# Patient Record
Sex: MALE | Race: WHITE | Employment: FULL TIME | URBAN - METROPOLITAN AREA
[De-identification: names, ages, dates, MRNs, and addresses within clinical notes are randomized per-mention and may not be internally consistent; named-entity substitution may affect disease eponyms.]

---

## 2022-10-29 ENCOUNTER — HOSPITAL ENCOUNTER (EMERGENCY)
Age: 52
Discharge: HOME | End: 2022-10-29
Attending: EMERGENCY MEDICINE | Admitting: EMERGENCY MEDICINE
Payer: COMMERCIAL

## 2022-10-29 VITALS
BODY MASS INDEX: 42.59 KG/M2 | WEIGHT: 265 LBS | TEMPERATURE: 100.2 F | DIASTOLIC BLOOD PRESSURE: 76 MMHG | OXYGEN SATURATION: 97 % | HEART RATE: 90 BPM | SYSTOLIC BLOOD PRESSURE: 139 MMHG | RESPIRATION RATE: 18 BRPM | HEIGHT: 66 IN

## 2022-10-29 DIAGNOSIS — J10.1 INFLUENZA A: Primary | ICD-10-CM

## 2022-10-29 LAB
FLUAV AG NPH QL IA: POSITIVE
FLUBV AG NPH QL IA: NEGATIVE
SPECIMEN SOURCE: ABNORMAL

## 2022-10-29 PROCEDURE — 87804 INFLUENZA ASSAY W/OPTIC: CPT

## 2022-10-29 PROCEDURE — 99283 EMERGENCY DEPT VISIT LOW MDM: CPT

## 2022-10-29 RX ORDER — SERTRALINE HYDROCHLORIDE 100 MG/1
100 TABLET, FILM COATED ORAL DAILY
COMMUNITY
Start: 2022-03-28 | End: 2023-09-23

## 2022-10-29 RX ORDER — IRBESARTAN 150 MG/1
150 TABLET ORAL DAILY
COMMUNITY
Start: 2022-03-28 | End: 2023-09-23

## 2022-10-29 RX ORDER — PHENTERMINE HYDROCHLORIDE 37.5 MG/1
TABLET ORAL
COMMUNITY
Start: 2021-09-09

## 2022-10-29 RX ORDER — ROSUVASTATIN CALCIUM 10 MG/1
10 TABLET, COATED ORAL
COMMUNITY
Start: 2022-03-28

## 2022-10-29 RX ORDER — FENOFIBRATE 160 MG/1
160 TABLET ORAL DAILY
COMMUNITY
Start: 2022-03-28

## 2022-10-29 ASSESSMENT — ENCOUNTER SYMPTOMS
COUGH: 1
SORE THROAT: 0
NAUSEA: 0
SHORTNESS OF BREATH: 0
VOMITING: 0
DIARRHEA: 0
ABDOMINAL PAIN: 0

## 2022-10-29 ASSESSMENT — PAIN - FUNCTIONAL ASSESSMENT: PAIN_FUNCTIONAL_ASSESSMENT: 0-10

## 2022-10-29 ASSESSMENT — PAIN SCALES - GENERAL: PAINLEVEL_OUTOF10: 7

## 2022-10-29 NOTE — ED PROVIDER NOTES
Emergency Department Provider Note                   PCP:                Corazon Osborn MD               Age: 45 y.o. Sex: male       ICD-10-CM    1. Influenza A  J10.1           DISPOSITION Decision To Discharge 10/29/2022 12:16:15 PM        MDM  Number of Diagnoses or Management Options  Influenza A  Diagnosis management comments: Vital signs reviewed, patient stable, NAD, afebrile, nontoxic in appearance     Rapid influenza obtained out of triage  Physical exam is reassuring. Lungs are clear to auscultation bilaterally, uvula is midline, no erythema of the posterior oropharynx, no positive for influenza A    Physical exam is reassuring. No tonsillar edema nor exudate, bilateral tympanic membranes pearly gray with appropriate light reflex, abdomen is soft and nontender. lungs are clear to auscultation bilaterally with negative egophony, no cervical adenopathy, rest of patient's physical exam is benign    Based on history, physical exam, I do not feel any further lab work nor any imaging is warranted at this time. Patient is stable and can be discharged home with follow-up to primary care. I discussed physical exam findings, laboratory and/or imaging findings, treatment and follow-up with the patient and those who were present. I answered any questions they had. They verbalized that they understood and were in agreement with treatment and disposition. I discussed signs and symptoms that would warrant a prompt return to the emergency department with the patient. I included the signs and symptoms on discharge paperwork. Patient verbalized that they understood. Discharged home in stable condition. He is to follow-up with primary care provider at the end of next week. Patient given a work note and I reiterated strict return to ED precautions. Patient verbalized that he understood and agreed.        Amount and/or Complexity of Data Reviewed  Clinical lab tests: ordered and reviewed  Review and summarize past medical records: yes    Risk of Complications, Morbidity, and/or Mortality  Presenting problems: low  Diagnostic procedures: moderate  Management options: low    Patient Progress  Patient progress: stable             Orders Placed This Encounter   Procedures    Rapid influenza A/B antigens        Medications - No data to display    New Prescriptions    No medications on file        Louis Ling is a 45 y.o. male who presents to the Emergency Department with chief complaint of    Chief Complaint   Patient presents with    Generalized Body Aches    Headache      57-year-old male with history of hypertension and hyperlipidemia presents to the emergency department today with chief complaint of body aches and cough beginning last night. Patient endorses chills and some mild nausea. Patient states family members in his household have all tested positive for influenza. Patient denies sore throat, headache, fevers, shortness of breath, chest pain, abdominal pain, vomiting, diarrhea. Nothing makes patient's condition better. Nothing makes patient's condition worse. No treatments tried. The history is provided by the patient. No  was used. Review of Systems   Constitutional:  Positive for chills. Negative for fever. HENT:  Negative for sore throat. Respiratory:  Positive for cough. Negative for shortness of breath. Cardiovascular:  Negative for chest pain. Gastrointestinal:  Negative for abdominal pain, diarrhea, nausea and vomiting. Musculoskeletal:  Positive for myalgias. Neurological:  Negative for headaches. All other systems reviewed and are negative. Past Medical History:   Diagnosis Date    HLD (hyperlipidemia)     HTN (hypertension)         Past Surgical History:   Procedure Laterality Date    ANTERIOR CRUCIATE LIGAMENT REPAIR Left     APPENDECTOMY N/A     ROTATOR CUFF REPAIR Left         No family history on file.      Social History     Socioeconomic History    Marital status:      Spouse name: None    Number of children: None    Years of education: None    Highest education level: None   Tobacco Use    Smoking status: Never    Smokeless tobacco: Current     Types: Chew   Substance and Sexual Activity    Alcohol use: Never    Drug use: Never         Penicillins     Previous Medications    FENOFIBRATE (TRIGLIDE) 160 MG TABLET    Take 160 mg by mouth daily    IRBESARTAN (AVAPRO) 150 MG TABLET    Take 150 mg by mouth daily    PHENTERMINE (ADIPEX-P) 37.5 MG TABLET    1 poq am and 1/2 q 2:00    ROSUVASTATIN (CRESTOR) 10 MG TABLET    Take 10 mg by mouth    SERTRALINE (ZOLOFT) 100 MG TABLET    Take 100 mg by mouth daily        Vitals signs and nursing note reviewed. Patient Vitals for the past 4 hrs:   Temp Pulse Resp BP SpO2   10/29/22 1122 -- -- -- 139/76 --   10/29/22 1121 100.2 °F (37.9 °C) 90 18 -- 97 %          Physical Exam  Vitals and nursing note reviewed. Constitutional:       General: He is not in acute distress. Appearance: Normal appearance. He is normal weight. He is not ill-appearing, toxic-appearing or diaphoretic. HENT:      Head: Normocephalic and atraumatic. Right Ear: Tympanic membrane, ear canal and external ear normal.      Left Ear: Tympanic membrane and ear canal normal.      Nose: Nose normal.      Mouth/Throat:      Lips: Pink. Mouth: Mucous membranes are moist.      Tongue: No lesions. Tongue does not deviate from midline. Palate: No mass and lesions. Pharynx: Oropharynx is clear. Uvula midline. No pharyngeal swelling, oropharyngeal exudate, posterior oropharyngeal erythema or uvula swelling. Tonsils: No tonsillar exudate or tonsillar abscesses. 0 on the right. 0 on the left. Eyes:      General: No scleral icterus. Extraocular Movements: Extraocular movements intact. Conjunctiva/sclera: Conjunctivae normal.   Cardiovascular:      Rate and Rhythm: Normal rate. Pulses: Normal pulses. Heart sounds: Normal heart sounds. Pulmonary:      Effort: Pulmonary effort is normal.      Breath sounds: Normal breath sounds. Comments: Negative egophony bilaterally  Abdominal:      General: Bowel sounds are normal.      Palpations: Abdomen is soft. Tenderness: There is no abdominal tenderness. There is no guarding. Musculoskeletal:         General: Normal range of motion. Cervical back: Normal range of motion and neck supple. Lymphadenopathy:      Cervical: No cervical adenopathy. Skin:     General: Skin is warm and dry. Capillary Refill: Capillary refill takes less than 2 seconds. Neurological:      General: No focal deficit present. Mental Status: He is alert and oriented to person, place, and time. Psychiatric:         Mood and Affect: Mood normal.         Behavior: Behavior normal.         Thought Content: Thought content normal.         Judgment: Judgment normal.        Procedures    Results for orders placed or performed during the hospital encounter of 10/29/22   Rapid influenza A/B antigens    Specimen: Nasal Washing   Result Value Ref Range    Influenza A Ag Positive (A) NEG      Influenza B Ag Negative NEG      Source Nasopharyngeal          No orders to display                       Voice dictation software was used during the making of this note. This software is not perfect and grammatical and other typographical errors may be present. This note has not been completely proofread for errors.       Emely No, [id number]  10/29/22 1231

## 2022-10-29 NOTE — ED NOTES
I have reviewed discharge instructions with the patient. The patient verbalized understanding. Patient left ED via Discharge Method: ambulatory to Home with Self. Opportunity for questions and clarification provided. Patient given 0 scripts. To continue your aftercare when you leave the hospital, you may receive an automated call from our care team to check in on how you are doing. This is a free service and part of our promise to provide the best care and service to meet your aftercare needs.  If you have questions, or wish to unsubscribe from this service please call [phone]. Thank you for Choosing our [facility] Emergency Department.        Sharifa Vaz RN  10/29/22 5505

## 2022-10-29 NOTE — DISCHARGE INSTRUCTIONS
Tested positive today for influenza A. Physical exam is reassuring    Treatment is symptomatic care such as pushing fluids, rest, fever reduction with Tylenol alternating with Motrin.   Follow-up with your primary care provider at the end of the week beginning of next week    Return to the emergency department if you have chest pain, shortness of breath, prolonged vomiting with inability to keep medications or fluids down, general worsening of your condition

## 2022-10-29 NOTE — ED TRIAGE NOTES
Pt states he has had nausea, headache, body aches since last night. Daughter and wife were dx with flu in last week.

## 2022-10-29 NOTE — Clinical Note
Tonia Seen was seen and treated in our emergency department on 10/29/2022. He may return to work on 11/04/2022. If you have any questions or concerns, please don't hesitate to call.       DAVID Woo

## 2022-10-29 NOTE — Clinical Note
Neeta Flores was seen and treated in our emergency department on 10/29/2022. He may return to work on 11/04/2022. If you have any questions or concerns, please don't hesitate to call.       DAVID Noriega

## 2024-08-23 ENCOUNTER — HOSPITAL ENCOUNTER (EMERGENCY)
Age: 54
Discharge: HOME | End: 2024-08-24
Attending: EMERGENCY MEDICINE
Payer: COMMERCIAL

## 2024-08-23 ENCOUNTER — APPOINTMENT (OUTPATIENT)
Dept: GENERAL RADIOLOGY | Age: 54
End: 2024-08-23
Payer: COMMERCIAL

## 2024-08-23 DIAGNOSIS — S93.602A SPRAIN OF LEFT FOOT, INITIAL ENCOUNTER: Primary | ICD-10-CM

## 2024-08-23 DIAGNOSIS — S93.402A SPRAIN OF LEFT ANKLE, UNSPECIFIED LIGAMENT, INITIAL ENCOUNTER: ICD-10-CM

## 2024-08-23 PROCEDURE — 96372 THER/PROPH/DIAG INJ SC/IM: CPT

## 2024-08-23 PROCEDURE — 6370000000 HC RX 637 (ALT 250 FOR IP): Performed by: EMERGENCY MEDICINE

## 2024-08-23 PROCEDURE — 6360000002 HC RX W HCPCS: Performed by: EMERGENCY MEDICINE

## 2024-08-23 PROCEDURE — 73650 X-RAY EXAM OF HEEL: CPT

## 2024-08-23 PROCEDURE — 73610 X-RAY EXAM OF ANKLE: CPT

## 2024-08-23 PROCEDURE — 73630 X-RAY EXAM OF FOOT: CPT

## 2024-08-23 PROCEDURE — 99284 EMERGENCY DEPT VISIT MOD MDM: CPT

## 2024-08-23 PROCEDURE — 73562 X-RAY EXAM OF KNEE 3: CPT

## 2024-08-23 RX ORDER — ONDANSETRON 4 MG/1
4 TABLET, ORALLY DISINTEGRATING ORAL
Status: COMPLETED | OUTPATIENT
Start: 2024-08-23 | End: 2024-08-23

## 2024-08-23 RX ORDER — MORPHINE SULFATE 4 MG/ML
4 INJECTION, SOLUTION INTRAMUSCULAR; INTRAVENOUS ONCE
Status: COMPLETED | OUTPATIENT
Start: 2024-08-23 | End: 2024-08-23

## 2024-08-23 RX ADMIN — ONDANSETRON 4 MG: 4 TABLET, ORALLY DISINTEGRATING ORAL at 22:47

## 2024-08-23 RX ADMIN — MORPHINE SULFATE 4 MG: 4 INJECTION, SOLUTION INTRAMUSCULAR; INTRAVENOUS at 22:47

## 2024-08-23 ASSESSMENT — PAIN DESCRIPTION - ORIENTATION: ORIENTATION: LEFT

## 2024-08-23 ASSESSMENT — PAIN DESCRIPTION - LOCATION: LOCATION: ANKLE

## 2024-08-23 ASSESSMENT — LIFESTYLE VARIABLES: HOW OFTEN DO YOU HAVE A DRINK CONTAINING ALCOHOL: NEVER

## 2024-08-23 ASSESSMENT — PAIN DESCRIPTION - DESCRIPTORS: DESCRIPTORS: ACHING

## 2024-08-23 ASSESSMENT — PAIN - FUNCTIONAL ASSESSMENT: PAIN_FUNCTIONAL_ASSESSMENT: 0-10

## 2024-08-23 ASSESSMENT — PAIN SCALES - GENERAL: PAINLEVEL_OUTOF10: 10

## 2024-08-24 ENCOUNTER — APPOINTMENT (OUTPATIENT)
Dept: CT IMAGING | Age: 54
End: 2024-08-24
Payer: COMMERCIAL

## 2024-08-24 VITALS
RESPIRATION RATE: 20 BRPM | HEART RATE: 78 BPM | TEMPERATURE: 99.1 F | SYSTOLIC BLOOD PRESSURE: 143 MMHG | OXYGEN SATURATION: 98 % | BODY MASS INDEX: 45 KG/M2 | HEIGHT: 66 IN | WEIGHT: 280 LBS | DIASTOLIC BLOOD PRESSURE: 76 MMHG

## 2024-08-24 PROCEDURE — 73700 CT LOWER EXTREMITY W/O DYE: CPT

## 2024-08-24 PROCEDURE — 6360000002 HC RX W HCPCS: Performed by: EMERGENCY MEDICINE

## 2024-08-24 PROCEDURE — 96372 THER/PROPH/DIAG INJ SC/IM: CPT

## 2024-08-24 RX ORDER — HYDROCODONE BITARTRATE AND ACETAMINOPHEN 5; 325 MG/1; MG/1
1 TABLET ORAL EVERY 6 HOURS PRN
Qty: 12 TABLET | Refills: 0 | Status: SHIPPED | OUTPATIENT
Start: 2024-08-24 | End: 2024-08-24

## 2024-08-24 RX ORDER — OXYCODONE HYDROCHLORIDE 5 MG/1
5 TABLET ORAL EVERY 6 HOURS PRN
Qty: 12 TABLET | Refills: 0 | Status: SHIPPED | OUTPATIENT
Start: 2024-08-24 | End: 2024-08-27

## 2024-08-24 RX ORDER — MELOXICAM 15 MG/1
15 TABLET ORAL DAILY PRN
Qty: 30 TABLET | Refills: 0 | Status: SHIPPED | OUTPATIENT
Start: 2024-08-24

## 2024-08-24 RX ORDER — KETOROLAC TROMETHAMINE 30 MG/ML
30 INJECTION, SOLUTION INTRAMUSCULAR; INTRAVENOUS
Status: COMPLETED | OUTPATIENT
Start: 2024-08-24 | End: 2024-08-24

## 2024-08-24 RX ADMIN — KETOROLAC TROMETHAMINE 30 MG: 30 INJECTION, SOLUTION INTRAMUSCULAR at 01:45

## 2024-08-24 ASSESSMENT — PAIN DESCRIPTION - LOCATION: LOCATION: ANKLE

## 2024-08-24 ASSESSMENT — PAIN SCALES - GENERAL: PAINLEVEL_OUTOF10: 10

## 2024-08-24 NOTE — ED TRIAGE NOTES
Arrives via wheelchair assisted by family. Reports left ankle pain and swelling. Onset Wednesday night. 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 Reports chronic knee pain with multiple surgeries. Main concern on arrival is left ankle. Nausea associated with pain. +PMS distal to injury

## 2024-08-24 NOTE — DISCHARGE INSTRUCTIONS
Wear boot when up and walking.  Elevate your leg above the level of your heart, apply ice as often as possible for at least 3 times a day for 20 minutes at a time, return for worse swelling pain or other concerns.

## 2024-08-25 NOTE — ED PROVIDER NOTES
obtained    FINDINGS: There is no evidence of fracture or other acute bony abnormality in  the foot. No bony lesions are seen.      Impression    Negative left foot      Electronically signed by Charles Corcoran   XR CALCANEUS LEFT (MIN 2 VIEWS)    Narrative    EXAMINATION: XR CALCANEUS LEFT (MIN 2 VIEWS) 8/23/2024 11:10 PM    ACCESSION NUMBER: [id number]    COMPARISON: None available    INDICATION: fall    TECHNIQUE: PA and lateral views of the chest were obtained.     FINDINGS:   No acute fracture or dislocation. There is a posterior calcaneal enthesophyte.  Bony alignment is anatomic. Joint spaces are well-maintained.      Impression    1. No acute fracture or dislocation.    Electronically signed by MARIA E GREER   XR KNEE LEFT (3 VIEWS)    Narrative    Left Knee    INDICATION: fall    COMPARISON:  None    TECHNIQUE: Three views of the left knee were obtained    FINDINGS:  There is no evidence of fracture or other acute bony abnormality.  Tibial osteotomy is seen. There our adjacent lucencies at the level of the  osteotomy site. Mild tricompartmental osteoarthrosis with joint space narrowing,  subchondral sclerosis and osteophyte formation.        Impression    Tibial osteotomy screws in place with adjacent lucencies. This may  represent a fracture line. Consider CT.          Electronically signed by Charles Corcoran   CT KNEE LEFT WO CONTRAST    Narrative    Clinical History/Indication for Exam:  ? fracture around hardware : ? fracture around hardware    CT LEFT LOWER EXTREMITY WITHOUT INTRAVENOUS CONTRAST KNEE    INDICATION:  ? fracture around hardware : ? fracture around hardware    TECHNIQUE:  Axial computed tomography images of the left knee without  intravenous contrast.  Sagittal and coronal reformatted images were  created and reviewed.  This CT exam was performed using one or more of  the following dose reduction techniques:  automated exposure control,  adjustment of the mA and/or kV according to patient  note.  This software is not perfect and grammatical and other typographical errors may be present.  This note has not been completely proofread for errors.      Fang Flores MD  08/24/24 1836